# Patient Record
Sex: FEMALE
[De-identification: names, ages, dates, MRNs, and addresses within clinical notes are randomized per-mention and may not be internally consistent; named-entity substitution may affect disease eponyms.]

---

## 2023-03-14 PROBLEM — Z00.00 ENCOUNTER FOR PREVENTIVE HEALTH EXAMINATION: Status: ACTIVE | Noted: 2023-03-14

## 2023-04-24 ENCOUNTER — APPOINTMENT (OUTPATIENT)
Dept: RHEUMATOLOGY | Facility: CLINIC | Age: 48
End: 2023-04-24
Payer: MEDICAID

## 2023-04-24 VITALS
HEART RATE: 89 BPM | DIASTOLIC BLOOD PRESSURE: 86 MMHG | TEMPERATURE: 98.2 F | OXYGEN SATURATION: 99 % | BODY MASS INDEX: 26.31 KG/M2 | HEIGHT: 62 IN | SYSTOLIC BLOOD PRESSURE: 126 MMHG | WEIGHT: 143 LBS

## 2023-04-24 DIAGNOSIS — Z78.9 OTHER SPECIFIED HEALTH STATUS: ICD-10-CM

## 2023-04-24 DIAGNOSIS — Z82.49 FAMILY HISTORY OF ISCHEMIC HEART DISEASE AND OTHER DISEASES OF THE CIRCULATORY SYSTEM: ICD-10-CM

## 2023-04-24 DIAGNOSIS — Z83.3 FAMILY HISTORY OF DIABETES MELLITUS: ICD-10-CM

## 2023-04-24 PROCEDURE — 99204 OFFICE O/P NEW MOD 45 MIN: CPT | Mod: 25

## 2023-04-24 RX ORDER — LORATADINE 5 MG/5 ML
SOLUTION, ORAL ORAL
Refills: 0 | Status: ACTIVE | COMMUNITY

## 2023-04-24 RX ORDER — VALACYCLOVIR 500 MG/1
TABLET, FILM COATED ORAL
Refills: 0 | Status: ACTIVE | COMMUNITY

## 2023-04-24 RX ORDER — IBUPROFEN 800 MG/1
TABLET, FILM COATED ORAL
Refills: 0 | Status: ACTIVE | COMMUNITY

## 2023-04-24 RX ORDER — AMLODIPINE BESYLATE 5 MG/1
TABLET ORAL
Refills: 0 | Status: ACTIVE | COMMUNITY

## 2023-04-25 NOTE — HISTORY OF PRESENT ILLNESS
[FreeTextEntry1] : April 24, 2023\par Initial Visit\par Patient referred for rheumatology evaluation by PMD\par ID# [id number] [place] : [place]\par \par Patient reports joint pains\par When sits down for about half hour, hard to stand up\par Takes a while to get up to get up and walk\par In all joints, especially in the knees\par Was evaluated by orthopedist for the knees, In 12/2022\par Had steroid injection in the right knee 12/7/2022, helped but not fully, takes ibuprofen for the pain\par Pain worse with climbing up and downstairs\par Patient does not have results of MRI with her today, does not have information \par \par Pain in the PIPs of fingers and IP thumb\par right elbow pain\par MRI right knee reports noted in the chart, but does not have full report\par Pain in the posterior right knee\par \par No family history of RA\par No family history of SLE\par \par +sun sensitivity\par No oral ulcers\par No alopecia\par Had physical about 8 months ago\par No anemia\par \par Takes ibuprofen 600 mg about one per day\par When pain strong, can take ibuprofen up to every four hours\par Helps a little bit unless pain is very strong\par Also sometimes takes acetaminophen as well\par \par Had xrays of the hands as well, s/p trigger finger thumb injection

## 2023-04-25 NOTE — PHYSICAL EXAM
[General Appearance - Alert] : alert [General Appearance - In No Acute Distress] : in no acute distress [General Appearance - Well-Appearing] : healthy appearing [Respiration, Rhythm And Depth] : normal respiratory rhythm and effort [Exaggerated Use Of Accessory Muscles For Inspiration] : no accessory muscle use [Edema] : there was no peripheral edema [No Spinal Tenderness] : no spinal tenderness [Abnormal Walk] : normal gait [] : no rash [Oriented To Time, Place, And Person] : oriented to person, place, and time [FreeTextEntry1] : right knee with pain with ROM, small effusion, no tenderness.  minimal edema of the fingers without carolyn synovitis. No joint tenderness at this time

## 2023-04-25 NOTE — DATA REVIEWED
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  MRI of the right knee also demonstrates subchondral fracture, likely subacute chronic.  No recent trauma or injury of the right knee.

## 2023-04-25 NOTE — ASSESSMENT
[FreeTextEntry1] : 47-year-old woman referred for rheumatology evaluation.  Patient with joint pain as well as morning stiffness.  Will rule out underlying inflammatory arthritis, will check labs today including CBC, CMP, ESR, CRP, rheumatoid factor and anti-CCP.  Given history of photosensitivity, will also check IWONA with reflex to serologies.  Patient reports previous hand x-rays as well as MRI of the knees, will try to reach out to primary care doctor for results.  Further management pending results, continue NSAIDs for now as per PMD, will follow-up in 2 weeks to review results, further management at that time.\par

## 2023-05-15 ENCOUNTER — LABORATORY RESULT (OUTPATIENT)
Age: 48
End: 2023-05-15

## 2023-05-15 ENCOUNTER — APPOINTMENT (OUTPATIENT)
Dept: RHEUMATOLOGY | Facility: CLINIC | Age: 48
End: 2023-05-15
Payer: MEDICAID

## 2023-05-15 VITALS
WEIGHT: 144 LBS | SYSTOLIC BLOOD PRESSURE: 124 MMHG | OXYGEN SATURATION: 100 % | DIASTOLIC BLOOD PRESSURE: 81 MMHG | HEIGHT: 62 IN | HEART RATE: 85 BPM | BODY MASS INDEX: 26.5 KG/M2 | TEMPERATURE: 97.5 F

## 2023-05-15 DIAGNOSIS — M25.50 PAIN IN UNSPECIFIED JOINT: ICD-10-CM

## 2023-05-15 LAB
ALBUMIN SERPL ELPH-MCNC: 4.8 G/DL
ALP BLD-CCNC: 99 U/L
ALT SERPL-CCNC: 17 U/L
ANACR T: NEGATIVE
ANION GAP SERPL CALC-SCNC: 12 MMOL/L
AST SERPL-CCNC: 18 U/L
BASOPHILS # BLD AUTO: 0.04 K/UL
BASOPHILS NFR BLD AUTO: 0.5 %
BILIRUB SERPL-MCNC: 0.2 MG/DL
BUN SERPL-MCNC: 6 MG/DL
CALCIUM SERPL-MCNC: 10.4 MG/DL
CCP AB SER IA-ACNC: <8 UNITS
CHLORIDE SERPL-SCNC: 103 MMOL/L
CO2 SERPL-SCNC: 26 MMOL/L
CREAT SERPL-MCNC: 0.71 MG/DL
CRP SERPL-MCNC: 6 MG/L
EGFR: 104 ML/MIN/1.73M2
EOSINOPHIL # BLD AUTO: 0.2 K/UL
EOSINOPHIL NFR BLD AUTO: 2.7 %
ERYTHROCYTE [SEDIMENTATION RATE] IN BLOOD BY WESTERGREN METHOD: 43 MM/HR
GLUCOSE SERPL-MCNC: 102 MG/DL
HCT VFR BLD CALC: 44.6 %
HGB BLD-MCNC: 13.7 G/DL
IMM GRANULOCYTES NFR BLD AUTO: 0.1 %
LYMPHOCYTES # BLD AUTO: 1.82 K/UL
LYMPHOCYTES NFR BLD AUTO: 24.5 %
MAN DIFF?: NORMAL
MCHC RBC-ENTMCNC: 28.8 PG
MCHC RBC-ENTMCNC: 30.7 GM/DL
MCV RBC AUTO: 93.9 FL
MONOCYTES # BLD AUTO: 0.47 K/UL
MONOCYTES NFR BLD AUTO: 6.3 %
NEUTROPHILS # BLD AUTO: 4.89 K/UL
NEUTROPHILS NFR BLD AUTO: 65.9 %
PLATELET # BLD AUTO: 273 K/UL
POTASSIUM SERPL-SCNC: 5 MMOL/L
PROT SERPL-MCNC: 7.3 G/DL
RBC # BLD: 4.75 M/UL
RBC # FLD: 13.1 %
RF+CCP IGG SER-IMP: NEGATIVE
RHEUMATOID FACT SER QL: <10 IU/ML
SODIUM SERPL-SCNC: 141 MMOL/L
WBC # FLD AUTO: 7.43 K/UL

## 2023-05-15 PROCEDURE — 99214 OFFICE O/P EST MOD 30 MIN: CPT | Mod: 25

## 2023-05-15 NOTE — REVIEW OF SYSTEMS
[Arthralgias] : arthralgias [Joint Stiffness] : joint stiffness [Negative] : Heme/Lymph [Joint Swelling] : no joint swelling

## 2023-05-15 NOTE — ASSESSMENT
[FreeTextEntry1] : 47-year-old woman returns for rheumatology evaluation.  Patient overall feeling well, right knee pain persist, following with orthopedist, reviewed previous MRI consistent with degenerative changes, meniscal tear, and chondromalacia patella, minimal benefit with previous steroid injection, completed physical therapy of March 2023 and continues home exercises.  Previous lab results reviewed with patient rheumatoid factor, CCP, and IWONA negative, with mildly elevated ESR and CRP, will repeat today.  No active synovitis on exam, reports morning stiffness, will repeat ESR and CRP today.  Would continue NSAIDs for now as per PMD, take cyclobenzaprine as needed for muscle spasm.  Further management pending results\par \par \par

## 2023-05-15 NOTE — HISTORY OF PRESENT ILLNESS
[FreeTextEntry1] : April 24, 2023\par Initial Visit\par Patient referred for rheumatology evaluation by PMD\par ID# [id number] [place] : [place]\par \par Patient reports joint pains\par When sits down for about half hour, hard to stand up\par Takes a while to get up to get up and walk\par In all joints, especially in the knees\par Was evaluated by orthopedist for the knees, In 12/2022\par Had steroid injection in the right knee 12/7/2022, helped but not fully, takes ibuprofen for the pain\par Pain worse with climbing up and downstairs\par Patient does not have results of MRI with her today, does not have information \par \par Pain in the PIPs of fingers and IP thumb\par right elbow pain\par MRI right knee reports noted in the chart, but does not have full report\par Pain in the posterior right knee\par \par No family history of RA\par No family history of SLE\par \par +sun sensitivity\par No oral ulcers\par No alopecia\par Had physical about 8 months ago\par No anemia\par \par Takes ibuprofen 600 mg about one per day\par When pain strong, can take ibuprofen up to every four hours\par Helps a little bit unless pain is very strong\par Also sometimes takes acetaminophen as well\par \par Had xrays of the hands as well, s/p trigger finger thumb injection\par \par May 15, 2023\par ID #[id number] [place]\par Patient returns for follow-up\par Reviewed lab results with patient, IWONA negative, rheumatoid factor negative, CCP negative\par Mild elevation in ESR and CRP noted\par At this time, patient was prescribed cyclobenzaprine by PMD, feels some improvement in symptoms\par Previously was taking ibuprofen, but stopped when started cyclobenzaprine\par Patient had steroid injection for the knee with minimal benefit\par Patient had trigger finger of the thumb injection, about 75% improvement, wears brace as well\par No PT at present, last time completed for knees, in March \par Continues home exercises as well\par No swollen joints.  No pain in the hands or upper extremities at this time\par Reports morning diffusely in all the joint\par No rashes, no history of psoriasis\par Reviewed previous imaging received from orthopedist

## 2023-05-15 NOTE — DATA REVIEWED
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  No joint effusion or loose body.\par

## 2023-05-15 NOTE — PHYSICAL EXAM
[General Appearance - Alert] : alert [General Appearance - In No Acute Distress] : in no acute distress [General Appearance - Well-Appearing] : healthy appearing [Sclera] : the sclera and conjunctiva were normal [Respiration, Rhythm And Depth] : normal respiratory rhythm and effort [Exaggerated Use Of Accessory Muscles For Inspiration] : no accessory muscle use [No Spinal Tenderness] : no spinal tenderness [Abnormal Walk] : normal gait [] : no rash [Oriented To Time, Place, And Person] : oriented to person, place, and time [Impaired Insight] : insight and judgment were intact [Affect] : the affect was normal [Mood] : the mood was normal [FreeTextEntry1] : Right knee with discomfort with ROM, no active synovitis

## 2023-05-16 LAB
CRP SERPL-MCNC: 5 MG/L
ERYTHROCYTE [SEDIMENTATION RATE] IN BLOOD BY WESTERGREN METHOD: 11 MM/HR

## 2023-07-12 ENCOUNTER — APPOINTMENT (OUTPATIENT)
Dept: RHEUMATOLOGY | Facility: CLINIC | Age: 48
End: 2023-07-12

## 2023-07-13 ENCOUNTER — APPOINTMENT (OUTPATIENT)
Dept: RHEUMATOLOGY | Facility: CLINIC | Age: 48
End: 2023-07-13